# Patient Record
Sex: FEMALE | Race: WHITE | NOT HISPANIC OR LATINO | Employment: OTHER | URBAN - METROPOLITAN AREA
[De-identification: names, ages, dates, MRNs, and addresses within clinical notes are randomized per-mention and may not be internally consistent; named-entity substitution may affect disease eponyms.]

---

## 2024-05-09 RX ORDER — CALCIUM/MAGNESIUM/ZINC 333-133 MG
1 TABLET ORAL DAILY
COMMUNITY

## 2024-05-09 RX ORDER — AMITRIPTYLINE HYDROCHLORIDE 100 MG/1
100 TABLET ORAL AT BEDTIME
COMMUNITY

## 2024-05-09 RX ORDER — UBIDECARENONE 100 MG
100 CAPSULE ORAL DAILY
COMMUNITY

## 2024-05-09 RX ORDER — DONEPEZIL HYDROCHLORIDE 5 MG/1
5 TABLET, FILM COATED ORAL AT BEDTIME
COMMUNITY

## 2024-05-09 RX ORDER — MULTIVITAMIN
1 TABLET ORAL DAILY
COMMUNITY

## 2024-05-09 RX ORDER — SPIRONOLACTONE 25 MG/1
12.5 TABLET ORAL DAILY
COMMUNITY

## 2024-05-09 RX ORDER — ATORVASTATIN CALCIUM 20 MG/1
20 TABLET, FILM COATED ORAL DAILY
COMMUNITY

## 2024-05-09 RX ORDER — METOPROLOL SUCCINATE 25 MG/1
25 TABLET, EXTENDED RELEASE ORAL DAILY
COMMUNITY

## 2024-05-09 RX ORDER — OXYBUTYNIN CHLORIDE 10 MG/1
10 TABLET, EXTENDED RELEASE ORAL DAILY
COMMUNITY

## 2024-05-09 RX ORDER — DOXYCYCLINE HYCLATE 100 MG
100 TABLET ORAL 2 TIMES DAILY
COMMUNITY

## 2024-05-09 RX ORDER — CHOLECALCIFEROL (VITAMIN D3) 50 MCG
1 TABLET ORAL DAILY
COMMUNITY

## 2024-05-09 RX ORDER — ASPIRIN 81 MG/1
81 TABLET ORAL DAILY
COMMUNITY

## 2024-05-09 RX ORDER — LISINOPRIL 10 MG/1
10 TABLET ORAL DAILY
COMMUNITY

## 2024-05-10 ENCOUNTER — TRANSITIONAL CARE UNIT VISIT (OUTPATIENT)
Dept: GERIATRICS | Facility: CLINIC | Age: 79
End: 2024-05-10
Payer: COMMERCIAL

## 2024-05-10 ENCOUNTER — DOCUMENTATION ONLY (OUTPATIENT)
Dept: GERIATRICS | Facility: CLINIC | Age: 79
End: 2024-05-10

## 2024-05-10 VITALS
TEMPERATURE: 96.6 F | HEART RATE: 88 BPM | BODY MASS INDEX: 23.98 KG/M2 | HEIGHT: 61 IN | OXYGEN SATURATION: 98 % | RESPIRATION RATE: 18 BRPM | DIASTOLIC BLOOD PRESSURE: 71 MMHG | SYSTOLIC BLOOD PRESSURE: 132 MMHG | WEIGHT: 127 LBS

## 2024-05-10 DIAGNOSIS — N18.30 STAGE 3 CHRONIC KIDNEY DISEASE, UNSPECIFIED WHETHER STAGE 3A OR 3B CKD (H): ICD-10-CM

## 2024-05-10 DIAGNOSIS — R29.6 RECURRENT FALLS: ICD-10-CM

## 2024-05-10 DIAGNOSIS — M62.81 GENERALIZED MUSCLE WEAKNESS: ICD-10-CM

## 2024-05-10 DIAGNOSIS — I50.9 CHRONIC HEART FAILURE, UNSPECIFIED HEART FAILURE TYPE (H): ICD-10-CM

## 2024-05-10 DIAGNOSIS — E11.9 DIABETES MELLITUS WITHOUT COMPLICATION (H): ICD-10-CM

## 2024-05-10 DIAGNOSIS — Z71.89 ACP (ADVANCE CARE PLANNING): ICD-10-CM

## 2024-05-10 DIAGNOSIS — N39.0 URINARY TRACT INFECTION WITHOUT HEMATURIA, SITE UNSPECIFIED: Primary | ICD-10-CM

## 2024-05-10 DIAGNOSIS — I10 HYPERTENSION, UNSPECIFIED TYPE: ICD-10-CM

## 2024-05-10 PROCEDURE — 99309 SBSQ NF CARE MODERATE MDM 30: CPT | Performed by: NURSE PRACTITIONER

## 2024-05-10 PROCEDURE — 99497 ADVNCD CARE PLAN 30 MIN: CPT | Performed by: NURSE PRACTITIONER

## 2024-05-10 RX ORDER — NAPROXEN 250 MG/1
250 TABLET ORAL DAILY
Status: SHIPPED
Start: 2024-05-10

## 2024-05-10 RX ORDER — ACETAMINOPHEN 500 MG
500-1000 TABLET ORAL 3 TIMES DAILY
Status: SHIPPED
Start: 2024-05-10

## 2024-05-10 NOTE — PROGRESS NOTES
[facility] GERIATRICS    PRIMARY CARE PROVIDER AND CLINIC:  [facility], [address] [place] [address], [address] / [facility] [id number]  Chief Complaint   Patient presents with    Hospital F/U      [place] Medical Record Number:  [id number]  Place of Service where encounter took place:  [facility] (TCU) [[id number]]    Lindsey Ronquillo  is a 78 year old  (1945), admitted to the above facility from  [facility]. Hospital stay 05/06/2024 through 05/09/2024..   HPI:    DISCHARGE DIAGNOSES:  Recurrent Falls suspected UTI     HOSPITAL COURSE:  78 y.o. female with past medical history significant for type 2 diabetes mellitus,, HFrEF, mild cognitive impairment, HTN, and HLD who presented to urgent care and then the ED 5/6/24 for evaluation of recurrent falls. Patient's UTI positive for leukoesterase. She was started on IV doxycycline and completed a 3-day course. Patient seen by PT and OT who recommended TCU. Patient discharged on doxycycline p.o. x 4 days to complete 7-day antibiotic course, even thou Urine Culture was negative. Patient also seen by dietary as she has been having very low appetite and not eating well at home. Patient was examined and medically cleared for discharge. Patient agrees with follow up care and expressed understanding.     Patient seen today for follow up, oriented, no distress, mild headache, denies dysuria, R side pain, misc bruising all extremities, L temple bruise, misc leg scabs with 2 abrasions open L knee, pain 7/10, loose stool, coccyx bruise with small scab, overall appears healthy, previous independent in home without WC/walker.    Advance Care Planning Goals of Care Discussion 5/10/2024  Goals of care discussed with Lindsey Ronquillo on 5/10. Present at discussion: patient. Questions discussed and patient response:  What is your understanding now of where you are with your illness?: good. How much information about what is likely to  be ahead with your illness would you like to have?: all. As the clinician I communicated the following to the patient regarding their prognosis: good. If your health situation worsens, what are your most important goals?: get home. What are your biggest fears and worries about the future with your health?: falls. Unacceptable function : NA. What abilities are so critical to your life that you cannot imagine living without them?: independence. Pt does NOT want to: die. If you become sicker, how much are you willing to go through for the possibility of gaining more time?: willing. Would this change if these were permanent states, if they did not get better?: maybe. How much does your agent and/or family know about your priorities and wishes?: everything. Added by MARCELLO Mullen CNP      CODE STATUS/ADVANCE DIRECTIVES DISCUSSION:  No Order  DNR only  ALLERGIES:   Allergies   Allergen Reactions    Cephalosporins     Erythromycin     Feathers     Ibuprofen     Papaya Derivatives     Sulfa Antibiotics       PAST MEDICAL HISTORY: History reviewed. No pertinent past medical history.   PAST SURGICAL HISTORY:   has no past surgical history on file.  FAMILY HISTORY: family history is not on file.  SOCIAL HISTORY:     Patient's living condition: lives alone    Post Discharge Medication Reconciliation Status:   MED REC REQUIRED  Post Medication Reconciliation Status: discharge medications reconciled and changed, per note/orders       Current Outpatient Medications   Medication Sig Dispense Refill    acetaminophen (TYLENOL) 500 MG tablet Take 1-2 tablets (500-1,000 mg) by mouth 3 times daily And 1000mg daily PRN      amitriptyline (ELAVIL) 100 MG tablet Take 100 mg by mouth at bedtime      aspirin 81 MG EC tablet Take 81 mg by mouth daily      atorvastatin (LIPITOR) 20 MG tablet Take 20 mg by mouth daily      calcium carbonate (OS-ZAKIA) 1500 (600 Ca) MG tablet Take 2 tablets by mouth daily      co-enzyme Q-10 100  "MG CAPS capsule Take 100 mg by mouth daily      donepezil (ARICEPT) 5 MG tablet Take 5 mg by mouth at bedtime      doxycycline hyclate (VIBRA-TABS) 100 MG tablet Take 100 mg by mouth 2 times daily      lisinopril (ZESTRIL) 10 MG tablet Take 10 mg by mouth daily      Magnesium Oxide 250 MG TABS Take 500 mg by mouth daily      melatonin 5 MG tablet Take 10 mg by mouth nightly as needed for sleep      metFORMIN (GLUCOPHAGE) 1000 MG tablet Take 1,000 mg by mouth 2 times daily      metoprolol succinate ER (TOPROL XL) 25 MG 24 hr tablet Take 25 mg by mouth daily      Milk Thistle-Dand-Fennel-Licor (MILK THISTLE XTRA) CAPS capsule Take 1 capsule by mouth daily      Multiple Vitamin (DAILY VITAMINS) TABS Take 1 tablet by mouth daily      naproxen (NAPROSYN) 250 MG tablet Take 1 tablet (250 mg) by mouth daily And 250mg daily PRN      Omega 3-6-9 Fatty Acids (OMEGA 3-6-9 COMPLEX PO) Take 1 capsule by mouth daily      oxyBUTYnin ER (DITROPAN XL) 10 MG 24 hr tablet Take 10 mg by mouth daily      spironolactone (ALDACTONE) 25 MG tablet Take 12.5 mg by mouth daily      vitamin D3 (CHOLECALCIFEROL) 50 mcg (2000 units) tablet Take 1 tablet by mouth daily       No current facility-administered medications for this visit.       ROS:  4 point ROS including Respiratory, CV, GI and , other than that noted in the HPI,  is negative    Vitals:  /71   Pulse 88   Temp (!) 96.6  F (35.9  C)   Resp 18   Ht 1.549 m (5' 1\")   Wt 57.6 kg (127 lb)   SpO2 98%   BMI 24.00 kg/m    Exam:  GENERAL APPEARANCE:  in no distress, appears healthy  ENT:  Mouth and posterior oropharynx normal, moist mucous membranes  RESP:  lungs clear to auscultation , no respiratory distress  CV:  regular rate and rhythm, no murmur, rub, or gallop, no edema  ABDOMEN:  bowel sounds normal  M/S:   Gait and station abnormal unsteady  SKIN:  Inspection of skin and subcutaneous tissue baseline  NEURO:   Examination of sensation by touch normal  PSYCH:  affect and " mood normal    Lab/Diagnostic data:  Recent labs in [facility] reviewed by me today.     ASSESSMENT/PLAN:    (N39.0) Urinary tract infection without hematuria, site unspecified  (primary encounter diagnosis)  (R29.6) Recurrent falls  (M62.81) Generalized muscle weakness  Comment: denies dysuria, numerous falls, deconditioned  Plan:   -PT/OT eval and treat  -L knee bandaids daily  -start APAP TID plus PRN  -start naproxen daily plus PRN  -discontinue omega3  -change BG's to daily  -trim toenails  -CBC, BMP, Mg, A1C on 5/13    (I10) Hypertension, unspecified type  Comment: SBP's in the 130 range  Plan: continue metoprolol, lisinopril  -daily vitals    (E11.9) Diabetes mellitus without complication (H)  Comment: no recent A1C in chart  Plan: continue metformin BID  -A1C on 5/13    (N18.30) Stage 3 chronic kidney disease, unspecified whether stage 3a or 3b CKD (H)  Comment: recent creat 1.07, GFR 53  Plan: dose meds renally  -BMP on 5/13    (I50.9) Chronic heart failure, unspecified heart failure type (H)  Comment: no s/s exacerbation, EF 20-25%  Plan: continue ASA, statin, spironolactone  -daily vitals    (Z71.89) ACP (advance care planning)  Comment: code DNR  Plan: [place] ADVANCE CARE PLANNING FIRST 30 MINS          I spent 17 minutes F2F with patient in addition to todays visit completing a POLST.        Electronically signed by:  MARCELLO Mullen CNP

## 2024-05-10 NOTE — LETTER
5/10/2024        RE: Lindsey Ronquillo  [address] [address]        [facility] GERIATRICS    PRIMARY CARE PROVIDER AND CLINIC:  [facility], [address], [address] / [facility] [address]  Chief Complaint   Patient presents with     Hospital F/U      [place] Medical Record Number:  [id number]  Place of Service where encounter took place:  [facility] (TCU) [[id number]]    Lindsey Ronquillo  is a 78 year old  (1945), admitted to the above facility from  [facility]. Hospital stay 05/06/2024 through 05/09/2024..   HPI:    DISCHARGE DIAGNOSES:  Recurrent Falls suspected UTI     HOSPITAL COURSE:  78 y.o. female with past medical history significant for type 2 diabetes mellitus,, HFrEF, mild cognitive impairment, HTN, and HLD who presented to urgent care and then the ED 5/6/24 for evaluation of recurrent falls. Patient's UTI positive for leukoesterase. She was started on IV doxycycline and completed a 3-day course. Patient seen by PT and OT who recommended TCU. Patient discharged on doxycycline p.o. x 4 days to complete 7-day antibiotic course, even thou Urine Culture was negative. Patient also seen by dietary as she has been having very low appetite and not eating well at home. Patient was examined and medically cleared for discharge. Patient agrees with follow up care and expressed understanding.     Patient seen today for follow up, oriented, no distress, mild headache, denies dysuria, R side pain, misc bruising all extremities, L temple bruise, misc leg scabs with 2 abrasions open L knee, pain 7/10, loose stool, coccyx bruise with small scab, overall appears healthy, previous independent in home without WC/walker.    Advance Care Planning Goals of Care Discussion 5/10/2024  Goals of care discussed with Lindsey Ronquillo on 5/10. Present at discussion: patient. Questions discussed and patient response:  What is your  understanding now of where you are with your illness?: good. How much information about what is likely to be ahead with your illness would you like to have?: all. As the clinician I communicated the following to the patient regarding their prognosis: good. If your health situation worsens, what are your most important goals?: get home. What are your biggest fears and worries about the future with your health?: falls. Unacceptable function : NA. What abilities are so critical to your life that you cannot imagine living without them?: independence. Pt does NOT want to: die. If you become sicker, how much are you willing to go through for the possibility of gaining more time?: willing. Would this change if these were permanent states, if they did not get better?: maybe. How much does your agent and/or family know about your priorities and wishes?: everything. Added by MARCELLO Mullen CNP      CODE STATUS/ADVANCE DIRECTIVES DISCUSSION:  No Order  DNR only  ALLERGIES:   Allergies   Allergen Reactions     Cephalosporins      Erythromycin      Feathers      Ibuprofen      Papaya Derivatives      Sulfa Antibiotics       PAST MEDICAL HISTORY: History reviewed. No pertinent past medical history.   PAST SURGICAL HISTORY:   has no past surgical history on file.  FAMILY HISTORY: family history is not on file.  SOCIAL HISTORY:     Patient's living condition: lives alone    Post Discharge Medication Reconciliation Status:   MED REC REQUIRED  Post Medication Reconciliation Status: discharge medications reconciled and changed, per note/orders       Current Outpatient Medications   Medication Sig Dispense Refill     acetaminophen (TYLENOL) 500 MG tablet Take 1-2 tablets (500-1,000 mg) by mouth 3 times daily And 1000mg daily PRN       amitriptyline (ELAVIL) 100 MG tablet Take 100 mg by mouth at bedtime       aspirin 81 MG EC tablet Take 81 mg by mouth daily       atorvastatin (LIPITOR) 20 MG tablet Take 20 mg by mouth  "daily       calcium carbonate (OS-ZAKIA) 1500 (600 Ca) MG tablet Take 2 tablets by mouth daily       co-enzyme Q-10 100 MG CAPS capsule Take 100 mg by mouth daily       donepezil (ARICEPT) 5 MG tablet Take 5 mg by mouth at bedtime       doxycycline hyclate (VIBRA-TABS) 100 MG tablet Take 100 mg by mouth 2 times daily       lisinopril (ZESTRIL) 10 MG tablet Take 10 mg by mouth daily       Magnesium Oxide 250 MG TABS Take 500 mg by mouth daily       melatonin 5 MG tablet Take 10 mg by mouth nightly as needed for sleep       metFORMIN (GLUCOPHAGE) 1000 MG tablet Take 1,000 mg by mouth 2 times daily       metoprolol succinate ER (TOPROL XL) 25 MG 24 hr tablet Take 25 mg by mouth daily       Milk Thistle-Dand-Fennel-Licor (MILK THISTLE XTRA) CAPS capsule Take 1 capsule by mouth daily       Multiple Vitamin (DAILY VITAMINS) TABS Take 1 tablet by mouth daily       naproxen (NAPROSYN) 250 MG tablet Take 1 tablet (250 mg) by mouth daily And 250mg daily PRN       Omega 3-6-9 Fatty Acids (OMEGA 3-6-9 COMPLEX PO) Take 1 capsule by mouth daily       oxyBUTYnin ER (DITROPAN XL) 10 MG 24 hr tablet Take 10 mg by mouth daily       spironolactone (ALDACTONE) 25 MG tablet Take 12.5 mg by mouth daily       vitamin D3 (CHOLECALCIFEROL) 50 mcg (2000 units) tablet Take 1 tablet by mouth daily       No current facility-administered medications for this visit.       ROS:  4 point ROS including Respiratory, CV, GI and , other than that noted in the HPI,  is negative    Vitals:  /71   Pulse 88   Temp (!) 96.6  F (35.9  C)   Resp 18   Ht 1.549 m (5' 1\")   Wt 57.6 kg (127 lb)   SpO2 98%   BMI 24.00 kg/m    Exam:  GENERAL APPEARANCE:  in no distress, appears healthy  ENT:  Mouth and posterior oropharynx normal, moist mucous membranes  RESP:  lungs clear to auscultation , no respiratory distress  CV:  regular rate and rhythm, no murmur, rub, or gallop, no edema  ABDOMEN:  bowel sounds normal  M/S:   Gait and station abnormal " unsteady  SKIN:  Inspection of skin and subcutaneous tissue baseline  NEURO:   Examination of sensation by touch normal  PSYCH:  affect and mood normal    Lab/Diagnostic data:  Recent labs in [facility] reviewed by me today.     ASSESSMENT/PLAN:    (N39.0) Urinary tract infection without hematuria, site unspecified  (primary encounter diagnosis)  (R29.6) Recurrent falls  (M62.81) Generalized muscle weakness  Comment: denies dysuria, numerous falls, deconditioned  Plan:   -PT/OT eval and treat  -L knee bandaids daily  -start APAP TID plus PRN  -start naproxen daily plus PRN  -discontinue omega3  -change BG's to daily  -trim toenails  -CBC, BMP, Mg, A1C on 5/13    (I10) Hypertension, unspecified type  Comment: SBP's in the 130 range  Plan: continue metoprolol, lisinopril  -daily vitals    (E11.9) Diabetes mellitus without complication (H)  Comment: no recent A1C in chart  Plan: continue metformin BID  -A1C on 5/13    (N18.30) Stage 3 chronic kidney disease, unspecified whether stage 3a or 3b CKD (H)  Comment: recent creat 1.07, GFR 53  Plan: dose meds renally  -BMP on 5/13    (I50.9) Chronic heart failure, unspecified heart failure type (H)  Comment: no s/s exacerbation, EF 20-25%  Plan: continue ASA, statin, spironolactone  -daily vitals    (Z71.89) ACP (advance care planning)  Comment: code DNR  Plan: [place] ADVANCE CARE PLANNING FIRST 30 MINS          I spent 17 minutes F2F with patient in addition to todays visit completing a POLST.        Electronically signed by:  MARCELLO Mullen CNP                    Sincerely,        MARCELLO Mullen CNP

## 2024-05-13 ENCOUNTER — LAB REQUISITION (OUTPATIENT)
Dept: LAB | Facility: CLINIC | Age: 79
End: 2024-05-13

## 2024-05-13 LAB
ANION GAP SERPL CALCULATED.3IONS-SCNC: 19 MMOL/L (ref 7–15)
BUN SERPL-MCNC: 22.7 MG/DL (ref 8–23)
CALCIUM SERPL-MCNC: 9.5 MG/DL (ref 8.8–10.2)
CHLORIDE SERPL-SCNC: 96 MMOL/L (ref 98–107)
CREAT SERPL-MCNC: 1.14 MG/DL (ref 0.51–0.95)
DEPRECATED HCO3 PLAS-SCNC: 20 MMOL/L (ref 22–29)
EGFRCR SERPLBLD CKD-EPI 2021: 49 ML/MIN/1.73M2
GLUCOSE SERPL-MCNC: 293 MG/DL (ref 70–99)
HBA1C MFR BLD: 8.3 %
MAGNESIUM SERPL-MCNC: 1.6 MG/DL (ref 1.7–2.3)
POTASSIUM SERPL-SCNC: 4 MMOL/L (ref 3.4–5.3)
SODIUM SERPL-SCNC: 135 MMOL/L (ref 135–145)

## 2024-05-13 PROCEDURE — 83036 HEMOGLOBIN GLYCOSYLATED A1C: CPT | Performed by: NURSE PRACTITIONER

## 2024-05-13 PROCEDURE — 85027 COMPLETE CBC AUTOMATED: CPT | Performed by: NURSE PRACTITIONER

## 2024-05-13 PROCEDURE — 83735 ASSAY OF MAGNESIUM: CPT | Performed by: NURSE PRACTITIONER

## 2024-05-13 PROCEDURE — 80048 BASIC METABOLIC PNL TOTAL CA: CPT | Performed by: NURSE PRACTITIONER

## 2024-05-14 LAB
ERYTHROCYTE [DISTWIDTH] IN BLOOD BY AUTOMATED COUNT: 15.7 % (ref 10–15)
HCT VFR BLD AUTO: 31.9 % (ref 35–47)
HGB BLD-MCNC: 11.1 G/DL (ref 11.7–15.7)
MCH RBC QN AUTO: 29.9 PG (ref 26.5–33)
MCHC RBC AUTO-ENTMCNC: 34.8 G/DL (ref 31.5–36.5)
MCV RBC AUTO: 86 FL (ref 78–100)
PLATELET # BLD AUTO: 190 10E3/UL (ref 150–450)
RBC # BLD AUTO: 3.71 10E6/UL (ref 3.8–5.2)
WBC # BLD AUTO: 5 10E3/UL (ref 4–11)